# Patient Record
Sex: MALE | Race: WHITE | NOT HISPANIC OR LATINO | URBAN - METROPOLITAN AREA
[De-identification: names, ages, dates, MRNs, and addresses within clinical notes are randomized per-mention and may not be internally consistent; named-entity substitution may affect disease eponyms.]

---

## 2023-04-24 ENCOUNTER — APPOINTMENT (OUTPATIENT)
Dept: GENERAL RADIOLOGY | Facility: HOSPITAL | Age: 38
End: 2023-04-24
Payer: COMMERCIAL

## 2023-04-24 ENCOUNTER — HOSPITAL ENCOUNTER (EMERGENCY)
Facility: HOSPITAL | Age: 38
Discharge: HOME | End: 2023-04-24
Attending: EMERGENCY MEDICINE
Payer: COMMERCIAL

## 2023-04-24 VITALS
RESPIRATION RATE: 14 BRPM | OXYGEN SATURATION: 100 % | WEIGHT: 220 LBS | BODY MASS INDEX: 29.8 KG/M2 | DIASTOLIC BLOOD PRESSURE: 79 MMHG | HEIGHT: 72 IN | SYSTOLIC BLOOD PRESSURE: 98 MMHG | TEMPERATURE: 97.8 F | HEART RATE: 100 BPM

## 2023-04-24 DIAGNOSIS — S29.019A THORACIC MYOFASCIAL STRAIN, INITIAL ENCOUNTER: ICD-10-CM

## 2023-04-24 DIAGNOSIS — M54.9 UPPER BACK PAIN ON LEFT SIDE: Primary | ICD-10-CM

## 2023-04-24 PROCEDURE — 71045 X-RAY EXAM CHEST 1 VIEW: CPT

## 2023-04-24 PROCEDURE — 99282 EMERGENCY DEPT VISIT SF MDM: CPT

## 2023-04-24 RX ORDER — NAPROXEN 375 MG/1
375 TABLET ORAL 2 TIMES DAILY PRN
Qty: 20 TABLET | Refills: 0 | Status: SHIPPED | OUTPATIENT
Start: 2023-04-24

## 2023-04-24 RX ORDER — CYCLOBENZAPRINE HCL 5 MG
5 TABLET ORAL 3 TIMES DAILY PRN
Qty: 30 TABLET | Refills: 0 | Status: SHIPPED | OUTPATIENT
Start: 2023-04-24
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
## 2023-04-24 NOTE — DISCHARGE INSTRUCTIONS
Please take Naprosyn and Flexeril as prescribed.  Apply warm compress to sore area and perform gentle stretching exercise to reduce spasm.  Seek immediate medical attention if having worsening symptoms or any concerns.

## 2023-06-04 ENCOUNTER — HOSPITAL ENCOUNTER (EMERGENCY)
Facility: HOSPITAL | Age: 38
Discharge: HOME | End: 2023-06-04
Attending: EMERGENCY MEDICINE | Admitting: EMERGENCY MEDICINE
Payer: COMMERCIAL

## 2023-06-04 VITALS
WEIGHT: 228.18 LBS | BODY MASS INDEX: 30.91 KG/M2 | RESPIRATION RATE: 20 BRPM | HEIGHT: 72 IN | HEART RATE: 88 BPM | SYSTOLIC BLOOD PRESSURE: 138 MMHG | OXYGEN SATURATION: 100 % | TEMPERATURE: 98.4 F | DIASTOLIC BLOOD PRESSURE: 78 MMHG

## 2023-06-04 DIAGNOSIS — T14.8XXA SUPERFICIAL ABRASION: ICD-10-CM

## 2023-06-04 DIAGNOSIS — Z59.00 HOMELESSNESS: ICD-10-CM

## 2023-06-04 DIAGNOSIS — L84 CALLUS OF FOOT: Primary | ICD-10-CM

## 2023-06-04 PROCEDURE — 99282 EMERGENCY DEPT VISIT SF MDM: CPT

## 2023-06-04 SDOH — ECONOMIC STABILITY - HOUSING INSECURITY: HOMELESSNESS UNSPECIFIED: Z59.00

## 2023-06-04 NOTE — ED PROVIDER NOTES
Time: 9:36 AM EDT  Date of encounter:  6/4/2023  Independent Historian/Clinical History and Information was obtained by:   Patient  Chief Complaint: Concern for insect bites    History is limited by: N/A    History of Present Illness:  Patient is a 37 y.o. year old male who presents to the emergency department for evaluation of possible insect bite such as spider and/or tick bites.  He also is concerned about blisters on his feet.  Patient is asking for food and a shower.  Patient admits to being homeless but states that he is in the process of buying a house.  Patient denies any injuries.    HPI    Patient Care Team  Primary Care Provider: Provider, No Known    Past Medical History:     No Known Allergies  History reviewed. No pertinent past medical history.  History reviewed. No pertinent surgical history.  History reviewed. No pertinent family history.    Home Medications:  Prior to Admission medications    Medication Sig Start Date End Date Taking? Authorizing Provider   cyclobenzaprine (FLEXERIL) 5 MG tablet Take 1 tablet by mouth 3 (Three) Times a Day As Needed for Muscle Spasms. 4/24/23   Tyree Collier MD   naproxen (NAPROSYN) 375 MG tablet Take 1 tablet by mouth 2 (Two) Times a Day As Needed for Mild Pain. 4/24/23   Tyree Collier MD        Social History:   Social History     Tobacco Use    Smoking status: Every Day     Types: Cigarettes   Substance Use Topics    Alcohol use: Yes     Comment: occ    Drug use: Yes     Types: Marijuana         Review of Systems:  Review of Systems   Constitutional:  Negative for chills and fever.   HENT:  Negative for congestion, ear pain and sore throat.    Eyes:  Negative for pain.   Respiratory:  Negative for cough, chest tightness and shortness of breath.    Cardiovascular:  Negative for chest pain.   Gastrointestinal:  Negative for abdominal pain, diarrhea, nausea and vomiting.   Genitourinary:  Negative for flank pain and hematuria.   Musculoskeletal:   "Negative for joint swelling.   Skin:  Negative for pallor.        Blisters on feet, multiple abrasions on legs   Neurological:  Negative for seizures and headaches.   All other systems reviewed and are negative.     Physical Exam:  /78 (BP Location: Left arm)   Pulse 88   Temp 98.4 °F (36.9 °C) (Oral)   Resp 20   Ht 182.9 cm (72\")   Wt 104 kg (228 lb 2.8 oz)   SpO2 100%   BMI 30.95 kg/m²     Physical Exam       Vital signs were reviewed under triage note.  General appearance - Patient appears well-developed and well-nourished.  Patient is in no acute distress.  Patient is disheveled and has obviously not had a bath in some time.  Head - Normocephalic, atraumatic.  Pupils - Equal, round, reactive to light.  Extraocular muscles are intact.  Conjunctiva is clear.  Nasal - Normal inspection.  No evidence of trauma or epistaxis.  Tympanic membranes - Gray, intact without erythema or retractions.  Oral mucosa - Pink and moist without lesions or erythema.  Uvula is midline.  Chest wall - Atraumatic.  Chest wall is nontender.  There are no vesicular rashes noted.  Neck - Supple.  Trachea was midline.  There is no palpable lymphadenopathy or thyromegaly.  There are no meningeal signs  Lungs - Clear to auscultation and percussion bilaterally.  Heart - Regular rate and rhythm without any murmurs, clicks, or gallops.  Abdomen - Soft.  Bowel sounds are present.  There is no palpable tenderness.  There is no rebound, guarding, or rigidity.  There are no palpable masses.  There are no pulsatile masses.  Back - Spine is straight and midline.  There is no CVA tenderness.  Extremities - Intact x4 with full range of motion.  There is no palpable edema.  Pulses are intact x4 and equal.  Neurologic - Patient is awake, alert, and oriented x3.  Cranial nerves II through XII are grossly intact.  Motor and sensory functions grossly intact.  Cerebellar function was normal.  Integument - There are no rashes.  There are no " blisters on his feet.  There are calluses noted.  There is no signs of any infection.  Patient has multiple superficial scrapes or abrasions on his thighs consistent with grasses or even thorns.  Could be from scratching as well.  There is no pustules.  There is no vesicles.  There is no lesions.  There is no tics.  There are no petechia or purpura lesions noted.  There are no vesicular lesions noted.      Procedures:  Procedures      Medical Decision Making:      Comorbidities that affect care:    None    External Notes reviewed:    Previous ED Note: ED note from [facility] dated 4/24/2023 was reviewed by me.      The following orders were placed and all results were independently analyzed by me:  No orders of the defined types were placed in this encounter.      Medications Given in the Emergency Department:  Medications - No data to display     ED Course:     The patient was seen and evaluated in the ED by me.  The above history and physical examination was performed as documented.  There are no physical findings of any wounds or skin lesions.  There are also no embedded tick bites.  Patient was seen by social service for any services that could be provided.  Patient was provided food.  Patient for discharge.    Labs:    Lab Results (last 24 hours)       ** No results found for the last 24 hours. **             Imaging:    No Radiology Exams Resulted Within Past 24 Hours      Differential Diagnosis and Discussion:    Rash: Differential diagnosis includes but is not limited to sepsis, cellulitis, [place] Spotted Fever, meningitis, meningococcemia, Varicella, Strep infection, dermatitis, allergic reaction, Lyme disease, and toxic shock syndrome.        MDM           Patient Care Considerations:    LABS: I considered ordering labs, however laboratory data would not alter ED treatment course or plan.      Consultants/Shared Management Plan:     was consulted.  They saw and evaluated the  patient.  Patient stable for discharge.    Social Determinants of Health:    Patient is independent, reliable, and has access to care.       Disposition and Care Coordination:    Discharged: The patient is suitable and stable for discharge with no need for consideration of observation or admission.    I have explained the patient´s condition, diagnoses and treatment plan based on the information available to me at this time. I have answered questions and addressed any concerns. The patient has a good  understanding of the patient´s diagnosis, condition, and treatment plan as can be expected at this point. The vital signs have been stable. The patient´s condition is stable and appropriate for discharge from the emergency department.      The patient will pursue further outpatient evaluation with the primary care physician or other designated or consulting physician as outlined in the discharge instructions. They are agreeable to this plan of care and follow-up instructions have been explained in detail. The patient has received these instructions in written format and have expressed an understanding of the discharge instructions. The patient is aware that any significant change in condition or worsening of symptoms should prompt an immediate return to this or the closest emergency department or call to 911.    Final diagnoses:   Callus of foot   Superficial abrasion   Homelessness        ED Disposition       ED Disposition   Discharge    Condition   Stable    Comment   --               This medical record created using voice recognition software.             Gerardo Chase DO  06/04/23 1007

## 2023-06-04 NOTE — SIGNIFICANT NOTE
06/04/23 0926   Plan   Plan Comments Provider requested that SW see this pt at bedside for homeles resources. Provider stated pt is inappropriate with females so SW went to speak with pt with an RN. SW met with pt at bedside and observed pt was very dishelveled and unkempt. Pt states that he has a place to go when he leaves here. SW gave pt resources packet for the [facility] in the event that he needed assistance. Provider updated.

## 2023-06-04 NOTE — DISCHARGE INSTRUCTIONS
Wash with antibacterial soap and water.  Pat dry.  Keep feet moisturized and dry.  Return to the ER for any concerns issues that may arise.